# Patient Record
Sex: FEMALE | Race: WHITE | NOT HISPANIC OR LATINO
[De-identification: names, ages, dates, MRNs, and addresses within clinical notes are randomized per-mention and may not be internally consistent; named-entity substitution may affect disease eponyms.]

---

## 2019-02-28 ENCOUNTER — TRANSCRIPTION ENCOUNTER (OUTPATIENT)
Age: 39
End: 2019-02-28

## 2020-01-18 ENCOUNTER — TRANSCRIPTION ENCOUNTER (OUTPATIENT)
Age: 40
End: 2020-01-18

## 2020-02-11 ENCOUNTER — OUTPATIENT (OUTPATIENT)
Dept: OUTPATIENT SERVICES | Facility: HOSPITAL | Age: 40
LOS: 1 days | End: 2020-02-11

## 2020-02-24 ENCOUNTER — TRANSCRIPTION ENCOUNTER (OUTPATIENT)
Age: 40
End: 2020-02-24

## 2021-03-15 ENCOUNTER — EMERGENCY (EMERGENCY)
Facility: HOSPITAL | Age: 41
LOS: 1 days | Discharge: LEFT BEFORE DISCHARGE | End: 2021-03-15
Attending: EMERGENCY MEDICINE
Payer: MEDICARE

## 2021-03-15 VITALS — HEIGHT: 64 IN | RESPIRATION RATE: 8 BRPM | HEART RATE: 110 BPM | OXYGEN SATURATION: 77 % | WEIGHT: 139.99 LBS

## 2021-03-15 VITALS — HEIGHT: 62 IN | WEIGHT: 139.99 LBS

## 2021-03-15 PROCEDURE — 99284 EMERGENCY DEPT VISIT MOD MDM: CPT

## 2021-03-15 PROCEDURE — 96374 THER/PROPH/DIAG INJ IV PUSH: CPT

## 2021-03-15 PROCEDURE — 92950 HEART/LUNG RESUSCITATION CPR: CPT

## 2021-03-15 PROCEDURE — 96375 TX/PRO/DX INJ NEW DRUG ADDON: CPT

## 2021-03-15 PROCEDURE — 96376 TX/PRO/DX INJ SAME DRUG ADON: CPT | Mod: XU

## 2021-03-15 PROCEDURE — 99285 EMERGENCY DEPT VISIT HI MDM: CPT | Mod: 25

## 2021-03-15 RX ORDER — NALOXONE HYDROCHLORIDE 4 MG/.1ML
0.4 SPRAY NASAL ONCE
Refills: 0 | Status: COMPLETED | OUTPATIENT
Start: 2021-03-15 | End: 2021-03-15

## 2021-03-15 RX ORDER — NALOXONE HYDROCHLORIDE 4 MG/.1ML
0.2 SPRAY NASAL ONCE
Refills: 0 | Status: COMPLETED | OUTPATIENT
Start: 2021-03-15 | End: 2021-03-15

## 2021-03-15 RX ADMIN — NALOXONE HYDROCHLORIDE 0.4 MILLIGRAM(S): 4 SPRAY NASAL at 15:15

## 2021-03-15 RX ADMIN — NALOXONE HYDROCHLORIDE 0.2 MILLIGRAM(S): 4 SPRAY NASAL at 16:00

## 2021-03-15 NOTE — PHARMACOTHERAPY INTERVENTION NOTE - COMMENTS
Called pharmacy to obtain medication list. Pt is on oxycodone/APAP 10/325 every 4 to 6 hours and alprazolam 1 mG TID

## 2021-03-15 NOTE — ED ADULT NURSE REASSESSMENT NOTE - NS ED NURSE REASSESS COMMENT FT1
pt given second dose of Narcan after dropping sat and becoming minimally responsive. pt arguing with staff and refusing care, wants to leave. ER MD and this RN at bedside,  pt advised by ED team that if she leaves she will leave against medical advice and could potentially die. risks explained, Narcan medication explained that it may wear off and she could pass out and die. again patient "doesn't care and will be fine." pt is otherwise AOX3, of sound mind to make her own decisions. pt removed her own IV. got up and is ambulating around ED looking for the bathroom. Narcan kid and teaching provided to patient. verbalizes and demonstrates understanding.

## 2021-03-15 NOTE — ED PROVIDER NOTE - ATTENDING CONTRIBUTION TO CARE
Brad: I performed a face to face bedside interview with patient regarding history of present illness, review of symptoms and past medical history. I completed an independent physical exam.  I have discussed patient's plan of care with resident.   I agree with note as stated above including HISTORY OF PRESENT ILLNESS, HIV, PAST MEDICAL/SURGICAL/FAMILY/SOCIAL HISTORY, ALLERGIES AND HOME MEDICATIONS, REVIEW OF SYSTEMS, PHYSICAL EXAM, MEDICAL DECISION MAKING and any PROGRESS NOTES during the time I functioned as the attending physician for this patient unless otherwise noted. My brief assessment is as follows: 39F h/o chronic back pain presented unresponsive. As per EMS found unresponsive in a car outside dialysis center (had dropped off her stepfather), one round of CPR performed by HD staff. Patient somewhat arousable with EMS, pupils constricted on arrival. Patient awoke after 0.4mg Narcan IV in ED. Admits to taking Oxycodone, she ran out of her prescription and took a tablet she bought on the street. Patient began falling asleep and desaturating to 50-60%, received a second dose of narcan and woke up. Once she was awake she refused to stay in the ED and was upset that we did not know the location of her car/purse/stepfather. Every attempt made to calm the patient, she was given a phone to try to call family. Patient AAO x 3, has decision making capacity. Would like to take a cab and leave the ER. Patient is alert and oriented times three. No acute distress. Discussed patient's current condition and need for further diagnostic evaluation. Discussed that the narcan would likely wear off and she would become somnolent and possibly stop breathing again. Discussed risk of death. Patient wants to leave and understands leaving against medical advise. Risks, benefits and alternatives explained. Offered Narcan Kit but patient did not want to stay for kit/training. Advised to follow up with physician tomorrow or return immediately for any change in symptoms. Patient understand that they can return to the ER at any time to continue evaluation. Patient verbalizes understanding to the above instructions.

## 2021-03-15 NOTE — ED PROVIDER NOTE - NSFOLLOWUPINSTRUCTIONS_ED_ALL_ED_FT
You were evaluated in the ED after overdosing on oxycodone.    You required Narcan to reverse your overdose as you were found to have low oxygen and you were not breathing adequately.    You were offered observation in the ED to ensure your symptoms did not return, however you chose to leave the hospital against medical advice.    You were advised of the risk and consequences of leaving the hospital against medical advice and you accepted those risks.    You were offered a Narcan kit in case you overdose again, but you refused to take it.    Please follow up with your doctor this week.    Please stop abusing drugs.    You can return to the ED at any time if you decide to change your mind.    [facility] has a drug treatment center if you would like to get help with your drug use. Please use the information below:    [facility]: [facility]  [address] [place] [address] , [place] [address] [phone]

## 2021-03-15 NOTE — ED ADULT NURSE NOTE - OBJECTIVE STATEMENT
pt arrives to ED with BVM in place as well as OPA for airway ventilation. as per EMS pt found in her car unresponsive by staff at a medical building, CPR was initiated because she was pulseless and 1 round was completed prior to EMS arrival. pt immediately bagged, 18g to left AC and pt arrives to ED lethargic with pinpoint pupils. pt brought to critical care, ER MD at bedside and Narcan administered with successful results.

## 2021-03-15 NOTE — ED ADULT TRIAGE NOTE - CHIEF COMPLAINT QUOTE
Patient brought in by ambulance, found outside dialysis center unresponsive, CPR performed by bystanders, .  OPA by EMS, downtime 40-50mins, sent to Critical Care and given Narcan.

## 2021-03-15 NOTE — ED PROVIDER NOTE - OBJECTIVE STATEMENT
40 y/o F w/ ?seizure hx, chronic back pain presenting unresponsive. Brought in by EMS. Per EMS, pt was taking family member to doctor's office or HD center when she reportedly was found unresponsive in a car. Staff at facility reportedly started CPR on her. Pt was somewhat arousable for EMS, however became less responsive and hypoxic requiring BVM and OPA insertion. EMS unsure or drug abuse. Pt reportedly found in car in parking lot, no concern for trauma per EMS.

## 2021-03-15 NOTE — ED PROVIDER NOTE - CLINICAL SUMMARY MEDICAL DECISION MAKING FREE TEXT BOX
38 y/o F w/ ?seizure hx, chronic back pain presenting unresponsive. Pt given narcan shortly after arrival w/ immediate improvement in respiratory status and mental status. Pupils enlarge to 5 mm b/l. After pt more awake, she reports taking 30 mg of oxycodone that she chewed. Given hx and response to narcan, will plan for 38 y/o F w/ ?seizure hx, chronic back pain presenting unresponsive. Pt given narcan shortly after arrival w/ immediate improvement in respiratory status and mental status. Pupils enlarge to 5 mm b/l. After pt more awake, she reports taking 30 mg of oxycodone that she chewed.

## 2021-03-15 NOTE — ED ADULT NURSE REASSESSMENT NOTE - NS ED NURSE REASSESS COMMENT FT1
pt slightly hypoxic on room air, placed on nasal cannula. pt remains easily arousable to verbal stimuli. no distress noted, IV site patent, NSR on monitor.

## 2021-03-15 NOTE — ED PROVIDER NOTE - PATIENT PORTAL LINK FT
You can access the FollowMyHealth Patient Portal offered by [facility] by registering at the following website: [email or web address]. By joining [facility]’s FollowMyHealth portal, you will also be able to view your health information using other applications (apps) compatible with our system.

## 2021-03-15 NOTE — ED PROVIDER NOTE - PROGRESS NOTE DETAILS
Dr. Fidel Lim, PGY-3: pt required additional dose of narcan for intermittent desaturation and bradypnea. Sats as low as 55 w/ good waveform. Pt A&O x3 after second narcan dose. Has decision making capacity. Pt repeatedly asking for her belongings and where her stepfather is. Pt was explained to multiple times that she did not arrive to the hospital with her purse and her stepfather did not come to the hospital with her. Offered pt phone to call family/friends to bring her purse however she reports not knowing any numbers. Pt adamantly refusing to stay in the hospital at this time. Informed pt that considering she required more narcan and the fact that her oxygen levels were dropping she would likely require additional narcan. Informed pt that there is a concern she could die as a result of this. Pt stated that she "did not care." Informed pt that if she chooses to leave the hospital against medical advice then she is willing to accept any and all consequences associated with that, up to and including death. Pt reports she accepts these risks. Attempted multiple times to get pt to change her mind, however she refused. Dr. Fidel Lim, PGY-3: pt required additional dose of narcan for intermittent desaturation and bradypnea. Sats as low as 55 w/ good waveform. Pt A&O x3 after second narcan dose. Has decision making capacity. Pt repeatedly asking for her belongings and where her stepfather is. Pt was explained to multiple times that she did not arrive to the hospital with her purse and her stepfather did not come to the hospital with her. Offered pt phone to call family/friends to bring her purse however she reports not knowing any numbers. Pt adamantly refusing to stay in the hospital at this time. Informed pt that considering she required more narcan and the fact that her oxygen levels were dropping she would likely require additional narcan. Informed pt that there is a concern she could die as a result of this. Pt stated that she "did not care." Informed pt that if she chooses to leave the hospital against medical advice then she is willing to accept any and all consequences associated with that, up to and including death. Pt reports she accepts these risks. Attempted multiple times to get pt to change her mind, however she refused. Attempted to provide pt w/ narcan kit however she refused to accept narcan training and kit. Pt also refused to sign AMA paperwork. AMA paperwork given to rep to scan in. Pt confirms name is Paris Viera and  is 1980. Critical alias ([place]) sticker placed on AMA form and name and  written on form. Advised pt she could return to ED at any time if she changed her mind.

## 2021-03-15 NOTE — ED ADULT NURSE REASSESSMENT NOTE - NS ED NURSE REASSESS COMMENT FT1
pt agitated and trying to pull out IV pt yelling at MD Salinas and MD Lim at bedside they are trying to educate pt on need for IV and need to stay in ED

## 2021-03-15 NOTE — ED PROVIDER NOTE - PHYSICAL EXAMINATION
Gen: unresponsive  Head: NCAT  HEENT: EOMI, oral mucosa moist, normal conjunctiva. pupils 2 mm b/l and sluggish  Lung: Saturating 90s on NC. Clear lungs.   CV: RRR, no murmurs  Abd: soft, NTND, no guarding  MSK: no visible deformities  Neuro: Appears non focal  Skin: Warm, well perfused  Psych: unable to assess

## 2021-03-15 NOTE — ED ADULT NURSE NOTE - NSIMPLEMENTINTERV_GEN_ALL_ED
Implemented All Universal Safety Interventions:  [place] to call system. Call bell, personal items and telephone within reach. Instruct patient to call for assistance. Room bathroom lighting operational. Non-slip footwear when patient is off stretcher. Physically safe environment: no spills, clutter or unnecessary equipment. Stretcher in lowest position, wheels locked, appropriate side rails in place.

## 2021-03-15 NOTE — ED ADULT NURSE NOTE - EXPLANATION OF AMA FORM SIGNATURE
pt refusing to sign because she believed she should be properly discharged, even though she is refusing all care and doesn't want to take the medical advice offered to her.

## 2021-03-16 RX ORDER — ALPRAZOLAM 0.25 MG
1 TABLET ORAL
Qty: 0 | Refills: 0 | DISCHARGE

## 2021-03-16 RX ORDER — SERTRALINE 25 MG/1
1 TABLET, FILM COATED ORAL
Qty: 0 | Refills: 0 | DISCHARGE

## 2022-02-27 ENCOUNTER — EMERGENCY (EMERGENCY)
Facility: HOSPITAL | Age: 42
LOS: 1 days | Discharge: HOME | End: 2022-02-27
Attending: STUDENT IN AN ORGANIZED HEALTH CARE EDUCATION/TRAINING PROGRAM | Admitting: STUDENT IN AN ORGANIZED HEALTH CARE EDUCATION/TRAINING PROGRAM
Payer: MEDICARE

## 2022-02-27 VITALS
OXYGEN SATURATION: 100 % | RESPIRATION RATE: 18 BRPM | DIASTOLIC BLOOD PRESSURE: 71 MMHG | HEART RATE: 97 BPM | SYSTOLIC BLOOD PRESSURE: 126 MMHG

## 2022-02-27 VITALS
SYSTOLIC BLOOD PRESSURE: 99 MMHG | DIASTOLIC BLOOD PRESSURE: 63 MMHG | RESPIRATION RATE: 16 BRPM | TEMPERATURE: 98 F | HEART RATE: 103 BPM | OXYGEN SATURATION: 100 %

## 2022-02-27 DIAGNOSIS — R10.9 UNSPECIFIED ABDOMINAL PAIN: ICD-10-CM

## 2022-02-27 LAB
ALBUMIN SERPL ELPH-MCNC: 4 G/DL — SIGNIFICANT CHANGE UP (ref 3.3–5)
ALP SERPL-CCNC: 82 U/L — SIGNIFICANT CHANGE UP (ref 40–120)
ALT FLD-CCNC: 21 U/L — SIGNIFICANT CHANGE UP (ref 12–78)
AMPHET UR-MCNC: NEGATIVE — SIGNIFICANT CHANGE UP
ANION GAP SERPL CALC-SCNC: 12 MMOL/L — SIGNIFICANT CHANGE UP (ref 5–17)
ANISOCYTOSIS BLD QL: SLIGHT — SIGNIFICANT CHANGE UP
APAP SERPL-MCNC: <2 UG/ML — LOW (ref 10–30)
AST SERPL-CCNC: 19 U/L — SIGNIFICANT CHANGE UP (ref 15–37)
BARBITURATES UR SCN-MCNC: NEGATIVE — SIGNIFICANT CHANGE UP
BASOPHILS # BLD AUTO: 0 K/UL — SIGNIFICANT CHANGE UP (ref 0–0.2)
BASOPHILS NFR BLD AUTO: 0 % — SIGNIFICANT CHANGE UP (ref 0–2)
BENZODIAZ UR-MCNC: NEGATIVE — SIGNIFICANT CHANGE UP
BILIRUB SERPL-MCNC: 0.1 MG/DL — LOW (ref 0.2–1.2)
BUN SERPL-MCNC: 8 MG/DL — SIGNIFICANT CHANGE UP (ref 7–23)
CALCIUM SERPL-MCNC: 8.1 MG/DL — LOW (ref 8.5–10.1)
CHLORIDE SERPL-SCNC: 105 MMOL/L — SIGNIFICANT CHANGE UP (ref 96–108)
CO2 SERPL-SCNC: 20 MMOL/L — LOW (ref 22–31)
COCAINE METAB.OTHER UR-MCNC: NEGATIVE — SIGNIFICANT CHANGE UP
CREAT SERPL-MCNC: 1.2 MG/DL — SIGNIFICANT CHANGE UP (ref 0.5–1.3)
EOSINOPHIL # BLD AUTO: 0.23 K/UL — SIGNIFICANT CHANGE UP (ref 0–0.5)
EOSINOPHIL NFR BLD AUTO: 1 % — SIGNIFICANT CHANGE UP (ref 0–6)
ETHANOL SERPL-MCNC: 82 MG/DL — HIGH (ref 0–10)
GLUCOSE SERPL-MCNC: 298 MG/DL — HIGH (ref 70–99)
HCG SERPL-ACNC: <1 MIU/ML — SIGNIFICANT CHANGE UP
HCT VFR BLD CALC: 25.4 % — LOW (ref 34.5–45)
HCT VFR BLD CALC: 32.9 % — LOW (ref 34.5–45)
HGB BLD-MCNC: 7.7 G/DL — LOW (ref 11.5–15.5)
HGB BLD-MCNC: 9.9 G/DL — LOW (ref 11.5–15.5)
LIDOCAIN IGE QN: 170 U/L — SIGNIFICANT CHANGE UP (ref 73–393)
LYMPHOCYTES # BLD AUTO: 1.17 K/UL — SIGNIFICANT CHANGE UP (ref 1–3.3)
LYMPHOCYTES # BLD AUTO: 5 % — LOW (ref 13–44)
MACROCYTES BLD QL: SLIGHT — SIGNIFICANT CHANGE UP
MANUAL SMEAR VERIFICATION: SIGNIFICANT CHANGE UP
MCHC RBC-ENTMCNC: 23.3 PG — LOW (ref 27–34)
MCHC RBC-ENTMCNC: 23.7 PG — LOW (ref 27–34)
MCHC RBC-ENTMCNC: 30.1 GM/DL — LOW (ref 32–36)
MCHC RBC-ENTMCNC: 30.3 GM/DL — LOW (ref 32–36)
MCV RBC AUTO: 77 FL — LOW (ref 80–100)
MCV RBC AUTO: 78.9 FL — LOW (ref 80–100)
METHADONE UR-MCNC: NEGATIVE — SIGNIFICANT CHANGE UP
MONOCYTES # BLD AUTO: 0.7 K/UL — SIGNIFICANT CHANGE UP (ref 0–0.9)
MONOCYTES NFR BLD AUTO: 3 % — SIGNIFICANT CHANGE UP (ref 2–14)
NEUTROPHILS # BLD AUTO: 21.29 K/UL — HIGH (ref 1.8–7.4)
NEUTROPHILS NFR BLD AUTO: 84 % — HIGH (ref 43–77)
NEUTS BAND # BLD: 7 % — SIGNIFICANT CHANGE UP (ref 0–8)
NRBC # BLD: 0 /100 WBCS — SIGNIFICANT CHANGE UP (ref 0–0)
NRBC # BLD: 0 — SIGNIFICANT CHANGE UP
NRBC # BLD: SIGNIFICANT CHANGE UP /100 WBCS (ref 0–0)
OPIATES UR-MCNC: NEGATIVE — SIGNIFICANT CHANGE UP
PCP SPEC-MCNC: SIGNIFICANT CHANGE UP
PCP UR-MCNC: NEGATIVE — SIGNIFICANT CHANGE UP
PLAT MORPH BLD: NORMAL — SIGNIFICANT CHANGE UP
PLATELET # BLD AUTO: 171 K/UL — SIGNIFICANT CHANGE UP (ref 150–400)
PLATELET # BLD AUTO: 224 K/UL — SIGNIFICANT CHANGE UP (ref 150–400)
POIKILOCYTOSIS BLD QL AUTO: SLIGHT — SIGNIFICANT CHANGE UP
POLYCHROMASIA BLD QL SMEAR: SLIGHT — SIGNIFICANT CHANGE UP
POTASSIUM SERPL-MCNC: 4.4 MMOL/L — SIGNIFICANT CHANGE UP (ref 3.5–5.3)
POTASSIUM SERPL-SCNC: 4.4 MMOL/L — SIGNIFICANT CHANGE UP (ref 3.5–5.3)
PROT SERPL-MCNC: 7.5 G/DL — SIGNIFICANT CHANGE UP (ref 6–8.3)
RBC # BLD: 3.3 M/UL — LOW (ref 3.8–5.2)
RBC # BLD: 4.17 M/UL — SIGNIFICANT CHANGE UP (ref 3.8–5.2)
RBC # FLD: 18.4 % — HIGH (ref 10.3–14.5)
RBC # FLD: 18.5 % — HIGH (ref 10.3–14.5)
RBC BLD AUTO: SIGNIFICANT CHANGE UP
SALICYLATES SERPL-MCNC: <1.7 MG/DL — LOW (ref 2.8–20)
SARS-COV-2 RNA SPEC QL NAA+PROBE: SIGNIFICANT CHANGE UP
SODIUM SERPL-SCNC: 137 MMOL/L — SIGNIFICANT CHANGE UP (ref 135–145)
THC UR QL: NEGATIVE — SIGNIFICANT CHANGE UP
WBC # BLD: 14.84 K/UL — HIGH (ref 3.8–10.5)
WBC # BLD: 23.4 K/UL — HIGH (ref 3.8–10.5)
WBC # FLD AUTO: 14.84 K/UL — HIGH (ref 3.8–10.5)
WBC # FLD AUTO: 23.4 K/UL — HIGH (ref 3.8–10.5)

## 2022-02-27 PROCEDURE — 71045 X-RAY EXAM CHEST 1 VIEW: CPT

## 2022-02-27 PROCEDURE — 96365 THER/PROPH/DIAG IV INF INIT: CPT | Mod: XU

## 2022-02-27 PROCEDURE — 93005 ELECTROCARDIOGRAM TRACING: CPT

## 2022-02-27 PROCEDURE — 99283 EMERGENCY DEPT VISIT LOW MDM: CPT

## 2022-02-27 PROCEDURE — 85025 COMPLETE CBC W/AUTO DIFF WBC: CPT

## 2022-02-27 PROCEDURE — 96375 TX/PRO/DX INJ NEW DRUG ADDON: CPT

## 2022-02-27 PROCEDURE — 83690 ASSAY OF LIPASE: CPT

## 2022-02-27 PROCEDURE — 96376 TX/PRO/DX INJ SAME DRUG ADON: CPT

## 2022-02-27 PROCEDURE — 74177 CT ABD & PELVIS W/CONTRAST: CPT | Mod: 26,MA

## 2022-02-27 PROCEDURE — 80307 DRUG TEST PRSMV CHEM ANLYZR: CPT

## 2022-02-27 PROCEDURE — 70450 CT HEAD/BRAIN W/O DYE: CPT | Mod: MA

## 2022-02-27 PROCEDURE — 84702 CHORIONIC GONADOTROPIN TEST: CPT

## 2022-02-27 PROCEDURE — 99284 EMERGENCY DEPT VISIT MOD MDM: CPT

## 2022-02-27 PROCEDURE — 87635 SARS-COV-2 COVID-19 AMP PRB: CPT

## 2022-02-27 PROCEDURE — 71045 X-RAY EXAM CHEST 1 VIEW: CPT | Mod: 26

## 2022-02-27 PROCEDURE — 82962 GLUCOSE BLOOD TEST: CPT

## 2022-02-27 PROCEDURE — 70450 CT HEAD/BRAIN W/O DYE: CPT | Mod: 26,MA

## 2022-02-27 PROCEDURE — 96361 HYDRATE IV INFUSION ADD-ON: CPT

## 2022-02-27 PROCEDURE — 85027 COMPLETE CBC AUTOMATED: CPT

## 2022-02-27 PROCEDURE — 99285 EMERGENCY DEPT VISIT HI MDM: CPT | Mod: 25

## 2022-02-27 PROCEDURE — 74177 CT ABD & PELVIS W/CONTRAST: CPT | Mod: MA

## 2022-02-27 PROCEDURE — 80053 COMPREHEN METABOLIC PANEL: CPT

## 2022-02-27 PROCEDURE — 93010 ELECTROCARDIOGRAM REPORT: CPT

## 2022-02-27 PROCEDURE — 36415 COLL VENOUS BLD VENIPUNCTURE: CPT

## 2022-02-27 RX ORDER — METOCLOPRAMIDE HCL 10 MG
10 TABLET ORAL ONCE
Refills: 0 | Status: COMPLETED | OUTPATIENT
Start: 2022-02-27 | End: 2022-02-27

## 2022-02-27 RX ORDER — ONDANSETRON 8 MG/1
4 TABLET, FILM COATED ORAL ONCE
Refills: 0 | Status: COMPLETED | OUTPATIENT
Start: 2022-02-27 | End: 2022-02-27

## 2022-02-27 RX ORDER — SODIUM CHLORIDE 9 MG/ML
1000 INJECTION INTRAMUSCULAR; INTRAVENOUS; SUBCUTANEOUS ONCE
Refills: 0 | Status: COMPLETED | OUTPATIENT
Start: 2022-02-27 | End: 2022-02-27

## 2022-02-27 RX ORDER — PIPERACILLIN AND TAZOBACTAM 4; .5 G/20ML; G/20ML
3.38 INJECTION, POWDER, LYOPHILIZED, FOR SOLUTION INTRAVENOUS ONCE
Refills: 0 | Status: COMPLETED | OUTPATIENT
Start: 2022-02-27 | End: 2022-02-27

## 2022-02-27 RX ADMIN — SODIUM CHLORIDE 1000 MILLILITER(S): 9 INJECTION INTRAMUSCULAR; INTRAVENOUS; SUBCUTANEOUS at 05:08

## 2022-02-27 RX ADMIN — SODIUM CHLORIDE 1000 MILLILITER(S): 9 INJECTION INTRAMUSCULAR; INTRAVENOUS; SUBCUTANEOUS at 01:49

## 2022-02-27 RX ADMIN — ONDANSETRON 4 MILLIGRAM(S): 8 TABLET, FILM COATED ORAL at 01:49

## 2022-02-27 RX ADMIN — PIPERACILLIN AND TAZOBACTAM 200 GRAM(S): 4; .5 INJECTION, POWDER, LYOPHILIZED, FOR SOLUTION INTRAVENOUS at 05:21

## 2022-02-27 RX ADMIN — ONDANSETRON 4 MILLIGRAM(S): 8 TABLET, FILM COATED ORAL at 04:34

## 2022-02-27 RX ADMIN — Medication 10 MILLIGRAM(S): at 05:08

## 2022-02-27 RX ADMIN — SODIUM CHLORIDE 1000 MILLILITER(S): 9 INJECTION INTRAMUSCULAR; INTRAVENOUS; SUBCUTANEOUS at 04:34

## 2022-02-27 RX ADMIN — SODIUM CHLORIDE 1000 MILLILITER(S): 9 INJECTION INTRAMUSCULAR; INTRAVENOUS; SUBCUTANEOUS at 03:15

## 2022-02-27 RX ADMIN — PIPERACILLIN AND TAZOBACTAM 3.38 GRAM(S): 4; .5 INJECTION, POWDER, LYOPHILIZED, FOR SOLUTION INTRAVENOUS at 06:06

## 2022-02-27 NOTE — ED ADULT NURSE NOTE - OBJECTIVE STATEMENT
Patient brought in by ambulance was reported by EMS was dropped off at [facility] unresponsive and was given Narcan spray upon arrival in ED was vomiting and inducing self to vomit when asked what happened she said that she bought 3 tabs of street drugs but thinks it's different today was seen and evaluated by  MD with orders made and carried out attached to cardiac monitor EKG done blood works drawn sent to lab.

## 2022-02-27 NOTE — ED PROVIDER NOTE - PHYSICAL EXAMINATION
Gen: Well appearing in NAD  Head: NC/AT  Neck: trachea midline  cv: tachycardic  Resp:  No distress, lungs clear  abd diffusely tender  Ext: no deformities  Neuro:  A&O appears non focal  Skin:  Warm and dry as visualized  Psych:  Normal affect and mood

## 2022-02-27 NOTE — CONSULT NOTE ADULT - ASSESSMENT
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

## 2022-02-27 NOTE — CONSULT NOTE ADULT - NSCONSULTADDITIONALINFOA_GEN_ALL_CORE
Case discussed with GUERO Ridley, images abd blood work personally reviewed.  I was led to believe this patient was being admitted to medical service for Fentanyl overdose.    With respect to her RYGB and multiple re-interventions for intussusception and internal hernia by her bariatric surgeon Dr Suero (?) in [facility], there does not appear to be any concerning findings with regard to the RYGB anatomy.  She should f/u for routine bariatric care with her primary bariatric surgeon.  The patient's abdominal pain following Narcan administration could be due to her intoxication.    Unclear at this point is the etiology of the elevated WBC.  No signs of acute appendicitis although the appendix is markedly dilated.  Pt reports left sided abdominal pain, not RLQ pain.  Dilated appendix could be secondary to chronic mucocele and should be addressed surgically (appendectomy) once recovered from this "overdose".   Left sided abdominal pain could also be secondary to ruptured left corpus luteal cyst as seen on CT scan.  GYN f/u may be considered although this is likely physiologic.    Appears to have been discharged by ED following our recommendations against acute surgical intervention.

## 2022-02-27 NOTE — ED PROVIDER NOTE - PROGRESS NOTE DETAILS
CT findings cant exclude internal hernia, also dialted appendix, no rlq tendenress on exam,   surgery to see patient CTs reviewed by john JACK and Dr Fisher, no acute intervention needed at tthis time, pt can follow up with her surgeon   also discussed pts anemia, she is unsure what her baseline hb is, but dneiesw bleeding black or bloody stools, heavy periods. states she has not beent aking her supplements and iron that she is supposed to since her surgery, recommended pt to restart her supplements

## 2022-02-27 NOTE — ED ADULT NURSE REASSESSMENT NOTE - NS ED NURSE REASSESS COMMENT FT1
As per MD Farmer no blood cultures needed; as per PA will discuss case with Dr Siegel and decide the plan of care.
Patient lying in bed informed of the plan of care with understanding call bell within reach.
Patient stated she cannot urinate at this time bladder scan 487 cc in bladder straight cath patient and was able to get urine was sent to lab complaining of headache MD Farmer aware ordered for reglan was given to patient being evaluated by PA surgery at this time.
Pt resting comfortably in bed at this time, offers no complaints.  Pain resolved.  No n/v/d.  No chest pain or SOB.  Pt to be discharged home.  Heplock x 2 dc'd.  Dc instructions given, pt expressed understanding.

## 2022-02-27 NOTE — ED PROVIDER NOTE - NSFOLLOWUPINSTRUCTIONS_ED_ALL_ED_FT
1. TAKE ALL MEDICATIONS AS DIRECTED.    2. FOR PAIN OR FEVER YOU CAN TAKE IBUPROFEN (MOTRIN, ADVIL) OR ACETAMINOPHEN (TYLENOL) AS NEEDED, AS DIRECTED ON PACKAGING.  3. FOLLOW UP WITH YOUR PRIMARY DOCTOR WITHIN 5 DAYS AS DIRECTED. PLease have your hemoglobin rechecked and start on your supplements.   4. IF YOU HAD LABS OR IMAGING DONE, YOU WERE GIVEN COPIES OF ALL LABS AND/OR IMAGING RESULTS FROM YOUR ER VISIT--PLEASE TAKE THEM WITH YOU TO YOUR FOLLOW UP APPOINTMENTS.  5. IF NEEDED, CALL PATIENT ACCESS SERVICES AT [phone] TO FIND A PRIMARY CARE PHYSICIAN.  OR CALL [phone] TO MAKE AN APPOINTMENT WITH THE CLINIC.  6. RETURN TO THE ER FOR ANY WORSENING SYMPTOMS OR CONCERNS.    Follow up with your surgeon as soon as possible,   Return to ED for new or worsening symptoms.     Abdominal Pain    Many things can cause abdominal pain. Many times, abdominal pain is not caused by a disease and will improve without treatment. Your health care provider will do a physical exam to determine if there is a dangerous cause of your pain; blood tests and imaging may help determine the cause of your pain. However, in many cases, no cause may be found and you may need further testing as an outpatient. Monitor your abdominal pain for any changes.     SEEK IMMEDIATE MEDICAL CARE IF YOU HAVE ANY OF THE FOLLOWING SYMPTOMS: worsening abdominal pain, uncontrollable vomiting, profuse diarrhea, inability to have bowel movements or pass gas, black or bloody stools, fever accompanying chest pain or back pain, or fainting. These symptoms may represent a serious problem that is an emergency. Do not wait to see if the symptoms will go away. Get medical help right away. Call 911 and do not drive yourself to the hospital.                  OPIOID USE DISORDER - General Information           Opioid Use Disorder    WHAT YOU NEED TO KNOW:    What is opioid use disorder (OUD)? OUD is a medical condition that develops from long-term use or misuse of an opioid. You are not able to stop taking the opioid even though it causes physical or social problems. OUD may be use of an opioid such as heroin or misuse of a prescription opioid such as fentanyl. This disorder is also called opioid abuse.    What are the signs and symptoms of OUD? Signs and symptoms of OUD include at least 2 of the following in a 12-month period:  •You take an opioid in a way it was not intended. This is also called misuse. Examples of misuse include taking more than prescribed or taking it longer than recommended. Another example is taking it for a different reason than prescribed. Your prescription may be for pain relief, but you take it because it makes you feel good. Misuse can also mean you take the opioid even though you do not have a prescription for it.      •You have a strong urge or craving for the opioid. This is also called addiction. You spend large amounts of time trying to get, take, or recover from the opioid. You also spend time thinking about when you can take the opioid again.      •You become dependent on the opioid. Dependence means your body becomes used to the opioid. You have withdrawal symptoms when you do not take the opioid for a short amount of time. You have to take it to stop or prevent withdrawal symptoms, such as shaky hands.      •You become tolerant to the opioid. Tolerance means you continue to need higher amounts to feel the effects you want.      •You are not able to stop, or to take less. You start it again when you try to quit. You try to lower the amount or increase time without it, but you are not able.      •You continue the opioid even though it causes problems or is dangerous. For example, you drive even though the opioid makes you drowsy. You try to make the effect stronger by mixing the opioid with alcohol, medicines, or drugs. You have problems at school, work, or home. You spend less time doing important or enjoyable activities.      How is OUD diagnosed and treated? Your healthcare provider will ask about the amount and type of opioids you had over the last 12 months. Your blood or urine may be used to check the level of opioid in your system. Your provider can help you make decisions about treatment. Treatment may be offered in a hospital, outpatient facility, or treatment center.   •Detoxification (detox) means healthcare providers will slowly decrease the amount of the opioid. Another opioid medicine, such as methadone, may be given to help decrease symptoms of withdrawal.      •Therapy may include work with a psychiatrist, psychologist, or therapist. Therapy can happen in group or individual sessions. Some therapy may include family members. Your healthcare provider or therapist may be able to help you find a support group in your area. A support group is a way to get help from others who have OUD.      What do I need to know about opioid safety?   •Do not suddenly stop the opioid. A sudden stop may cause dangerous side effects. Work with your healthcare provider to decrease the amount slowly.      •Do not take prescription opioids that belong to someone else. The kind or amount may not be right for you.      •Do not mix opioids with other medicines, drugs, or alcohol. The combination can cause an overdose, or cause you to stop breathing. Alcohol, sleeping pills, and medicines such as antihistamines can make you sleepy. A combination with opioids can lead to a coma.      •Learn about the signs of an overdose. Examples include slow breathing, pale or cold skin, and small pupils. Tell others about these signs so they will get help for you if needed. Talk to your healthcare provider about naloxone. You may be able to keep naloxone at home in case of an overdose. Your family and friends can also be trained on how to give it to you if needed.      •Take prescribed opioids exactly as directed. Do not take more than the recommended amount. Do not take it more often than recommended or for a different reason. Be sure to remove an old patch before you place a new one. Make sure the patch is not exposed to sunlight. Sunlight speeds up the opioid release from the patch.      •Keep opioids out of the reach of children. Store opioids in a locked cabinet or in a location that children cannot get to.  Common Childproofing Latches            •Follow instructions for what to do with leftover prescription opioids. Your healthcare provider will give you instructions for how to dispose of it safely. This helps make sure no one else gets to it.      Where can I get support and more information?   •Substance Abuse and Mental Health Services Administration  [address]  [place] [address]  Web Address: http://www.samhsa.gov      •National [place] on Drug Abuse  [address], [address]  [place], [place]  Phone: 1-419.993.1261  Web Address: www.krysten.nih.gov        Call your local emergency number (911 in the [place]) or have someone call if:   •You have chest pain or trouble breathing.      •You have a seizure.      •You cannot be woken.      When should I seek immediate care?   •You have trouble staying awake and your breathing is slow or shallow.      •You have a fast, slow, or irregular heartbeat.      •You have pale or cold skin.      •You feel lightheaded or faint.      •Your speech is slurred, or you are confused.      When should I call my doctor?   •You have nausea and are vomiting, or you cannot stop vomiting.      •You have balance problems.      •You have questions or concerns about your condition or care.      CARE AGREEMENT:    You have the right to help plan your care. Learn about your health condition and how it may be treated. Discuss treatment options with your healthcare providers to decide what care you want to receive. You always have the right to refuse treatment.        ANEMIA - Discharge Care           Anemia    WHAT YOU NEED TO KNOW:    Anemia is a low number of red blood cells or a low amount of hemoglobin in your red blood cells. Hemoglobin is a protein that helps carry oxygen throughout your body. Red blood cells use iron to create hemoglobin. Anemia may develop if your body does not have enough iron. It may also develop if your body does not make enough red blood cells or they die faster than your body can make them.    DISCHARGE INSTRUCTIONS:    Call your local emergency number (911 in the ), or have someone call if:   •You lose consciousness.      •You have severe chest pain.      Seek care immediately if:   •You have dark or bloody bowel movements.          Call your doctor if:   •Your symptoms are worse, even after treatment.      •You have questions or concerns about your condition or care.      Medicines:   •Iron or folic acid supplements help increase your red blood cell and hemoglobin levels.       •Vitamin B12 injections may help boost your red blood cell level and decrease your symptoms. Ask your healthcare provider how to inject B12.      •Take your medicine as directed. Contact your healthcare provider if you think your medicine is not helping or if you have side effects. Tell him of her if you are allergic to any medicine. Keep a list of the medicines, vitamins, and herbs you take. Include the amounts, and when and why you take them. Bring the list or the pill bottles to follow-up visits. Carry your medicine list with you in case of an emergency.      Prevent anemia: Eat healthy foods rich in iron and vitamin C. Nuts, meat, dark leafy green vegetables, and beans are high in iron and protein. Vitamin C helps your body absorb iron. Foods rich in vitamin C include oranges and other citrus fruits. Ask your healthcare provider for a list of other foods that are high in iron or vitamin C. Ask if you need to be on a special diet.    Sources of Iron       Sources of Vitamin C         Follow up with your doctor as directed: Write down your questions so you remember to ask them during your visits.

## 2022-02-27 NOTE — ED PROVIDER NOTE - PATIENT PORTAL LINK FT
You can access the FollowMyHealth Patient Portal offered by [facility] by registering at the following website: [email or web address]. By joining [facility] FollowMyHealth portal, you will also be able to view your health information using other applications (apps) compatible with our system.

## 2022-02-27 NOTE — CONSULT NOTE ADULT - PROBLEM SELECTOR RECOMMENDATION 9
-Discussed with Dr. Fisher  -No acute findings on CT  -No indication for acute surgical intervention  -Pt should follow-up with Dr. DE JESUS at [facility] for poss elective appendectomy    Surgical Team Contact Information  Spectralink: Ext: [phone] or [phone]  Pager: [phone] -Discussed with Dr. Fisher  -No acute findings on CT pertaining to RYGB.  Anastomosis patent with no signs of obstruction.  Anastomosis in appropriate location.  No signs of mesenteric swirling.  CT imaging consistent with JJ anastomotic changes in the mesentery.  -No indication for acute bariatric intervention.  -Dilated appendix without signs of appendicitis.  Possible mucocele, should be addressed surgically once recovered from acute intoxication.  Surgical follow up recommended.  -Pt should follow-up with Dr. DE JESUS [facility] for poss elective appendectomy    Surgical Team Contact Information  Spectralink: Ext: [phone] or [phone]  Pager: [phone]

## 2022-02-27 NOTE — ED PROVIDER NOTE - OBJECTIVE STATEMENT
40yo F ho substance abuse presents after episode of unresponsiveness, pt states she took a pill from her friend, believed it was oxycontin and then has no recollection of what happened next. per ems pt was found unresponsive given 2mg of narcan x 2 with response. since then pt states she having severe abd pain and nausea, arrives trying to self induce vomiting. 42yo F ho substance abuse presents after episode of unresponsiveness, pt states she took a pill from her friend, believed it was oxycontin and then has no recollection of what happened next. per ems pt was found unresponsive given 2mg of narcan x 2 with response. since then pt states she having severe abd pain and nausea, arrives trying to self induce vomiting.  pt states she has been given narcan multiple times in the past 42yo F ho substance abuse presents after episode of unresponsiveness, pt states she took a pill from her friend, believed it was oxycontin and then has no recollection of what happened next. per ems pt was found unresponsive given 2mg of narcan x 2 with response. since then pt states she having severe abd pain and nausea, arrives trying to self induce vomiting. 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 per ems pt was found unresponsive given 2mg of narcan x 2 with response. since then pt states she having severe abd pain and nausea, arrives trying to self induce vomiting. 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

## 2022-02-27 NOTE — ED PROVIDER NOTE - CLINICAL SUMMARY MEDICAL DECISION MAKING FREE TEXT BOX
40yo F sp opiate overdose, responded to narcan, now with vomiting and abd pain  will obs, labs, CT, fluids,  pt states she has narcan kit at home

## 2022-02-27 NOTE — ED ADULT TRIAGE NOTE - CHIEF COMPLAINT QUOTE
per ems was dropped off at [facility] unresponsive, was given 4mg of narcan intranasal by ems, arrives to ED awake and vomiting

## 2022-02-27 NOTE — CONSULT NOTE ADULT - SUBJECTIVE AND OBJECTIVE BOX
40yo F with PMHx of substance abuse w/multiple opiate ODs req narcan, s/p CLAYTON bypass (4/2009, [facility]AVI[facility]A [facility]), internal hernia repair (2010), intussusception with revision (2020) presents after episode of unresponsiveness, pt states she took a pill from her friend, believed it was oxycontin and then has no recollection of what happened next. Per ems pt was found unresponsive given 2mg of narcan x 2 with response. Since then pt states she having severe abd pain and nausea, arrives trying to self induce vomiting. Pt states she had L sided hernia that she occasionally has to reduce on her own. Pt states abdominal pain started today described as dull aching on L side.    PAST MEDICAL & SURGICAL HISTORY:    Allergies:  Wellbutrin (Seizure)    Home Medications:      Family History:  FAMILY HISTORY:      ROS:  Constitutional: SEE HPI  Skin: Denies rash.  Eyes: Denies recent vision problems or eye pain.  ENT: Denies congestion, ear pain, or sore throat.  Endocrine: Denies thyroid problems.  Cardiovascular: Denies chest pain or palpation.  Respiratory: Denies cough, shortness of breath, congestion, or wheezing.  Gastrointestinal: SEE HPI  Genitourinary: Denies dysuria.  Musculoskeletal: Denies joint swelling.  Neurologic: Denies headache.      PHYSICAL EXAM:  GENERAL: No acute distress, well-developed  HEAD:  Atraumatic, Normocephalic  ABDOMEN: Soft, mildly-tender on left, non-distended laparoscopic scars noted  NEUROLOGY: A&O x 3, no focal deficits    Data:  T(C): 36.6 (02-27-22 @ 06:05), Max: 36.6 (02-27-22 @ 06:05)  HR: 91 (02-27-22 @ 06:05) (86 - 97)  BP: 99/66 (02-27-22 @ 06:05) (91/62 - 126/71)  RR: 16 (02-27-22 @ 06:05) (16 - 18)  SpO2: 99% (02-27-22 @ 06:05) (99% - 100%)                        7.7    14.84 )-----------( 171      ( 27 Feb 2022 05:01 )             25.4     02-27    137  |  105  |  8   ----------------------------<  298<H>  4.4   |  20<L>  |  1.20    Ca    8.1<L>      27 Feb 2022 01:59    TPro  7.5  /  Alb  4.0  /  TBili  0.1<L>  /  DBili  x   /  AST  19  /  ALT  21  /  AlkPhos  82  02-27      LIVER FUNCTIONS - ( 27 Feb 2022 01:59 )  Alb: 4.0 g/dL / Pro: 7.5 g/dL / ALK PHOS: 82 U/L / ALT: 21 U/L / AST: 19 U/L / GGT: x           Radiology:  < from: CT Abdomen and Pelvis w/ IV Cont (02.27.22 @ 04:15) >  FINDINGS:    LOWER CHEST: Atelectasis.    LIVER: No focal lesion. Suggest mild periportal edema, which can be seen   in fluid resuscitation.  GALLBLADDER/BILE DUCTS: No intrahepatic or extrahepatic biliary   dilatation. No significant gallbladder edema.  PANCREAS: Unremarkable.  SPLEEN: Unremarkable.    ADRENALS: Unremarkable.  KIDNEYS/URETERS: No hydronephrosis, hydroureter or significant   perinephric stranding.  BLADDER: Partially distended.  REPRODUCTIVE ORGANS: Anteverted uterus with a 3.0 x 2.2 cm hypoenhancing   lesion at the fundus, likely fibroid. A 2.0 x 2.0 cm corpus luteum.    BOWEL: Blake-en-Y gastric bypass. Somewhat clustered small bowel loops in   theleft abdomen with a question of mild mesenteric swirling and minimal   mesenteric edema. Jejunojejunal anastomosis located in the left abdomen.   No bowel obstruction. Dilated, fluid-filled mid/distal appendix (up to   2.0 cm in diameter) without significant inflammatory change.  PERITONEUM: Small pelvic free fluid with mildly increased attenuation,   which may contain blood products/debris.  VESSELS: Atherosclerosis. Normal caliber of the abdominal aorta.  RETROPERITONEUM: No lymphadenopathy.  ABDOMINAL WALL/SOFT TISSUES: Postsurgical changes along the anterior   abdominal wall soft tissue. Small fat-containing umbilical hernia.  BONES: Degenerative changes of the spine. Grade 1 anterolisthesis of L5   on S1 with bilateral L5 pars defect. Nonspecific small sclerotic focus at   the right sacrum.    IMPRESSION:    Blake-en-Y gastric bypass. Small bowel findings in the left abdomen as   described, which may be related to post surgical status although internal   hernia is not entirely excluded. No bowel obstruction. Recommend   comparison to previous outside study and follow-up as indicated.    Dilated, fluid-filled mid/distal appendix. Differential diagnosis   includes appendiceal mucocele although neoplasm cannot be excluded. No   significant inflammatory change. Recommend clinical correlation to assess   appendicitis and follow-up.    Findings suggestive of partially ruptured left ovarian corpus   luteal/hemorrhagic cyst. Recommend clinical correlation follow-up pelvic   ultrasoundas indicated.    Additional findings as described.    Discussed with Dr. Farmer.    --- End of Report ---    KALINA ABBOTT MD; Attending Radiologist  This document has been electronically signed. Feb 27 2022  4:52AM    < end of copied text >

## 2022-02-27 NOTE — ED ADULT NURSE NOTE - CHIEF COMPLAINT QUOTE
per ems was dropped off at [facility] unresponsive, was given 4mg of narcan intranasal by ems, arrives to ED awake and vomiting

## 2022-02-27 NOTE — ED PROVIDER NOTE - CARE PLAN
1 Principal Discharge DX:	Drug overdose  Secondary Diagnosis:	Anemia  Secondary Diagnosis:	Abdominal pain

## 2022-03-02 PROBLEM — F32.9 MAJOR DEPRESSIVE DISORDER, SINGLE EPISODE, UNSPECIFIED: Chronic | Status: ACTIVE | Noted: 2021-03-15

## 2022-03-02 PROBLEM — M54.9 DORSALGIA, UNSPECIFIED: Chronic | Status: ACTIVE | Noted: 2021-03-15

## 2022-08-16 PROBLEM — Z00.00 ENCOUNTER FOR PREVENTIVE HEALTH EXAMINATION: Status: ACTIVE | Noted: 2022-08-16

## 2022-08-17 ENCOUNTER — APPOINTMENT (OUTPATIENT)
Dept: VASCULAR SURGERY | Facility: CLINIC | Age: 42
End: 2022-08-17

## 2022-08-24 ENCOUNTER — APPOINTMENT (OUTPATIENT)
Dept: VASCULAR SURGERY | Facility: CLINIC | Age: 42
End: 2022-08-24

## 2022-10-27 ENCOUNTER — NON-APPOINTMENT (OUTPATIENT)
Age: 42
End: 2022-10-27

## 2022-10-31 ENCOUNTER — APPOINTMENT (OUTPATIENT)
Dept: PODIATRY | Facility: CLINIC | Age: 42
End: 2022-10-31

## 2022-10-31 VITALS — BODY MASS INDEX: 30.12 KG/M2 | WEIGHT: 170 LBS | HEIGHT: 63 IN

## 2022-10-31 DIAGNOSIS — Z78.9 OTHER SPECIFIED HEALTH STATUS: ICD-10-CM

## 2022-10-31 DIAGNOSIS — Z86.59 PERSONAL HISTORY OF OTHER MENTAL AND BEHAVIORAL DISORDERS: ICD-10-CM

## 2022-10-31 PROCEDURE — 20600 DRAIN/INJ JOINT/BURSA W/O US: CPT | Mod: RT

## 2022-10-31 PROCEDURE — 99214 OFFICE O/P EST MOD 30 MIN: CPT | Mod: 25

## 2022-10-31 NOTE — PHYSICAL EXAM
[Right] : right foot and ankle [Mild] : mild swelling of toe(s) [1st] : 1st [2+] : posterior tibialis pulse: 2+ [] : no full range of motion of foot [de-identified] : MTP joint DF 10 degrees [TWNoteComboBox6] : MTP joint PF 10 degrees

## 2022-10-31 NOTE — REASON FOR VISIT
[FreeTextEntry2] : Patient is here for right foot seen back in 2020 and cortisone injection it got better but now its a lot worst

## 2022-10-31 NOTE — ASSESSMENT
[FreeTextEntry1] : CELESTONE 6 MG APPLIED WITH LIDOCAINE PLAIN 10 MG  TO REDUCE PAIN AND SWELLING\par CELESTONE 2 UNITS, LIDOCAINE PLAIN 2 UNITS\par NSAID OF CHOICE FOR PAIN.\par TYLENOL FOR PAIN\par COLD COMPRESSES.\par \par I DISCUSSED SURGICAL INTERVENTION WHICH MAY INCLUDE REMODELING OF JOINT, IMPLANT OR FUSION

## 2022-11-14 ENCOUNTER — APPOINTMENT (OUTPATIENT)
Dept: ORTHOPEDIC SURGERY | Facility: CLINIC | Age: 42
End: 2022-11-14

## 2022-11-14 VITALS — BODY MASS INDEX: 30.12 KG/M2 | WEIGHT: 170 LBS | HEIGHT: 63 IN

## 2022-11-14 PROCEDURE — 99214 OFFICE O/P EST MOD 30 MIN: CPT

## 2022-11-14 PROCEDURE — 72100 X-RAY EXAM L-S SPINE 2/3 VWS: CPT

## 2022-11-14 NOTE — HISTORY OF PRESENT ILLNESS
[Gradual] : gradual [7] : 7 [6] : 6 [Sharp] : sharp [Shooting] : shooting [Throbbing] : throbbing [Constant] : constant [Heat] : heat [Disabled] : Work status: disabled [de-identified] : Patient presents today with lower back pain for years with NKI. Previously completed PT, tried pain medication and had trigger point injections. Experiencing pain radiating down the right side to the mid thigh and the left buttock, has tingling. Taking Advil/Tylenol with no relief. Denies recent imaging.  [] : no [FreeTextEntry7] :  down the right side to the mid thigh and the left buttock [de-identified] : activity

## 2022-11-15 ENCOUNTER — APPOINTMENT (OUTPATIENT)
Dept: PODIATRY | Facility: CLINIC | Age: 42
End: 2022-11-15

## 2022-11-15 PROCEDURE — 20600 DRAIN/INJ JOINT/BURSA W/O US: CPT | Mod: RT

## 2022-11-15 PROCEDURE — 99213 OFFICE O/P EST LOW 20 MIN: CPT | Mod: 25

## 2022-11-15 NOTE — REASON FOR VISIT
[FreeTextEntry2] : Patient is here for right foot follow up.  FEELS 40% IMPROVED AFTER THE INJECTION.

## 2022-11-15 NOTE — ASSESSMENT
[FreeTextEntry1] : CELESTONE 6 MG APPLIED WITH LIDOCAINE PLAIN 10 MG  TO REDUCE PAIN AND SWELLING\par CELESTONE 2 UNITS, LIDOCAINE PLAIN 2 UNITS\par NSAID OF CHOICE FOR PAIN.\par TYLENOL FOR PAIN\par COLD COMPRESSES.\par \par

## 2022-11-15 NOTE — PHYSICAL EXAM
[Right] : right foot and ankle [NL (40)] : MTP joint DF 40 degrees [NL (20)] : MTP joint PF 20 degrees [5___] : [facility] 5[unfilled]/5 [2+] : posterior tibialis pulse: 2+ [Normal] : saphenous nerve sensation normal [] : non-antalgic [FreeTextEntry3] : NONE

## 2022-12-20 ENCOUNTER — APPOINTMENT (OUTPATIENT)
Dept: PODIATRY | Facility: CLINIC | Age: 42
End: 2022-12-20

## 2022-12-20 PROCEDURE — 99213 OFFICE O/P EST LOW 20 MIN: CPT | Mod: 25

## 2022-12-20 PROCEDURE — 20600 DRAIN/INJ JOINT/BURSA W/O US: CPT | Mod: RT

## 2022-12-20 NOTE — ASSESSMENT
[FreeTextEntry1] : CELESTONE 6 MG APPLIED WITH LIDOCAINE PLAIN 10 MG  TO REDUCE PAIN AND SWELLING FIRST MPJ RIGHT FOOT AND INTERSPACE 1ST. \par CELESTONE 2 UNITS, LIDOCAINE PLAIN 2 UNITS\par NSAID OF CHOICE FOR PAIN.\par TYLENOL FOR PAIN\par COLD COMPRESSES.\par I DISCUSSED SURGICAL CORRECTION OF BUNION IF NO CHANGE WITH INJECTION.\par WILL REPEAT INJECTION IN 6-8 WEEKS. \par RTO 2 MONTHS\par

## 2022-12-20 NOTE — HISTORY OF PRESENT ILLNESS
[de-identified] : Patient states pain is increasing since last visit. She reports that the pain is not as bad as when she first came in but it is increasing over time.

## 2022-12-20 NOTE — PHYSICAL EXAM
[Right] : right foot and ankle [5___] : [facility] 5[unfilled]/5 [2+] : posterior tibialis pulse: 2+ [Normal] : saphenous nerve sensation normal [Normal Coordination] : normal coordination [Normal DTR UE/LE] : normal DTR UE/LE  [Normal Sensation] : normal sensation [Normal Mood and Affect] : normal mood and affect [Orientated] : orientated [Normal Skin] : normal skin [No Rash] : no rash [No Ulcers] : no ulcers [No Lesions] : no lesions [Well Developed] : well developed [Well Nourished] : well nourished [Mild] : mild swelling of toe(s) [1st] : 1st [NL (40)] : plantar flexion 40 degrees [NL 30)] : inversion 30 degrees [NL (20)] : eversion 20 degrees [] : non-antalgic [FreeTextEntry3] : NONE

## 2023-01-31 ENCOUNTER — APPOINTMENT (OUTPATIENT)
Dept: PODIATRY | Facility: CLINIC | Age: 43
End: 2023-01-31

## 2023-03-28 ENCOUNTER — APPOINTMENT (OUTPATIENT)
Dept: ORTHOPEDIC SURGERY | Facility: CLINIC | Age: 43
End: 2023-03-28
Payer: MEDICARE

## 2023-03-28 DIAGNOSIS — M20.11 HALLUX VALGUS (ACQUIRED), RIGHT FOOT: ICD-10-CM

## 2023-03-28 DIAGNOSIS — M19.079 PRIMARY OSTEOARTHRITIS, UNSPECIFIED ANKLE AND FOOT: ICD-10-CM

## 2023-03-28 PROCEDURE — 99213 OFFICE O/P EST LOW 20 MIN: CPT | Mod: 25

## 2023-03-28 PROCEDURE — 20600 DRAIN/INJ JOINT/BURSA W/O US: CPT | Mod: RT

## 2023-03-28 NOTE — ASSESSMENT
[FreeTextEntry1] : CELESTONE 6 MG APPLIED WITH LIDOCAINE PLAIN 10 MG  TO REDUCE PAIN AND SWELLING\par CELESTONE 2 UNITS, LIDOCAINE PLAIN 2 UNITS APPLIED TO 1ST MPJ. RIGHT FOOT.\par NSAID OF CHOICE FOR PAIN.\par TYLENOL FOR PAIN\par COLD COMPRESSES.\par I DISCUSSED BUNION REPAIR.\par RTO PRN\par \par \par

## 2023-03-28 NOTE — PHYSICAL EXAM
[Right] : right foot and ankle [Mild] : mild swelling of MTP joint/great toe [1st] : 1st [NL (40)] : MTP joint DF 40 degrees [NL (20)] : MTP joint PF 20 degrees [5___] : [facility] 5[unfilled]/5 [2+] : posterior tibialis pulse: 2+ [Normal] : saphenous nerve sensation normal [] : varicosities are not warm and well perfused

## 2023-03-28 NOTE — HISTORY OF PRESENT ILLNESS
[de-identified] : Patient states pain is increasing since last visit. She reports that the pain is not as bad as when she first came in but it is increasing over time.

## 2023-03-28 NOTE — PROCEDURE
[Other: ____] : [unfilled] [Right] : of the right [1st] : 1st [Metatarsophalangeal joint] : metatarsophalangeal joint [Pain] : pain [Inflammation] : inflammation [X-ray evidence of Osteoarthritis on this or prior visit] : x-ray evidence of Osteoarthritis on this or prior visit [Repeat series performed] : repeat series performed [Alcohol] : alcohol [Betadine] : betadine [Ethyl Chloride sprayed topically] : ethyl chloride sprayed topically [Sterile technique used] : sterile technique used [___ cc    3mg] :  Betamethasone (Celestone) ~Vcc of 3mg [___ cc    1%] : Lidocaine ~Vcc of 1%

## 2023-04-19 NOTE — ED ADULT NURSE REASSESSMENT NOTE - BEFAST EYES
In an effort to ensure that our patients [employer], a Team Member has reviewed your chart and identified an opportunity to provide the best care possible. An attempt was made to discuss or schedule overdue Preventive or Disease Management screening.     The Outcome was Contact was not made, left message If you have any questions or need help with scheduling, contact our Health Outreach Team at [phone]. Care Gaps include Diabetes and Immunizations.    Mary Richmond  Population Health Assistant   PH. [phone]       No

## 2023-06-26 ENCOUNTER — FORM ENCOUNTER (OUTPATIENT)
Age: 43
End: 2023-06-26

## 2023-11-16 ENCOUNTER — NON-APPOINTMENT (OUTPATIENT)
Age: 43
End: 2023-11-16

## 2024-01-18 ENCOUNTER — APPOINTMENT (OUTPATIENT)
Dept: ORTHOPEDIC SURGERY | Facility: CLINIC | Age: 44
End: 2024-01-18
Payer: MEDICARE

## 2024-01-18 DIAGNOSIS — M51.36 OTHER INTERVERTEBRAL DISC DEGENERATION, LUMBAR REGION: ICD-10-CM

## 2024-01-18 DIAGNOSIS — M47.817 SPONDYLOSIS W/OUT MYELOPATHY OR RADICULOPATHY, LUMBOSACRAL REGION: ICD-10-CM

## 2024-01-18 DIAGNOSIS — Q76.0 SPINA BIFIDA OCCULTA: ICD-10-CM

## 2024-01-18 DIAGNOSIS — M48.061 SPINAL STENOSIS, LUMBAR REGION WITHOUT NEUROGENIC CLAUDICATION: ICD-10-CM

## 2024-01-18 DIAGNOSIS — M51.37 OTHER INTERVERTEBRAL DISC DEGENERATION, LUMBOSACRAL REGION: ICD-10-CM

## 2024-01-18 DIAGNOSIS — M43.17 SPONDYLOLISTHESIS, LUMBOSACRAL REGION: ICD-10-CM

## 2024-01-18 DIAGNOSIS — M70.61 TROCHANTERIC BURSITIS, RIGHT HIP: ICD-10-CM

## 2024-01-18 DIAGNOSIS — M43.06 SPONDYLOLYSIS, LUMBAR REGION: ICD-10-CM

## 2024-01-18 PROCEDURE — 99214 OFFICE O/P EST MOD 30 MIN: CPT

## 2024-01-18 RX ORDER — DICLOFENAC SODIUM 1% 10 MG/G
1 GEL TOPICAL 4 TIMES DAILY
Qty: 1 | Refills: 2 | Status: ACTIVE | COMMUNITY
Start: 2024-01-18 | End: 1900-01-01

## 2024-01-19 NOTE — DATA REVIEWED
[MRI] : MRI [Lumbar Spine] : lumbar spine [I independently reviewed and interpreted images and report] : I independently reviewed and interpreted images and report [FreeTextEntry1] : 6/2023 MRI of L-Spine was reviewed today and is as follows:  Bulging disc L4-5 Anterolisthesis L5-S1 with bilateral pars defect Bilateral foraminal stenosis L5-S1 R>L

## 2024-01-19 NOTE — HISTORY OF PRESENT ILLNESS
[Gradual] : gradual [7] : 7 [6] : 6 [Sharp] : sharp [Shooting] : shooting [Throbbing] : throbbing [Constant] : constant [Heat] : heat [Disabled] : Work status: disabled [de-identified] : Follow up lumbar spine MRI Results at [place]. Patient states she has chronic pain. Patient states her pain radiates into the left buttock. Patient denies taking pain medication.  Today's Pain 8/10 [] : no [FreeTextEntry7] :  down the right side to the mid thigh and the left buttock [de-identified] : activity

## 2024-01-19 NOTE — ASSESSMENT
[FreeTextEntry1] : 6/2023 MRI of L-Spine was reviewed today and is as follows:  Bulging disc L4-5 Anterolisthesis L5-S1 with bilateral pars defect Bilateral foraminal stenosis L5-S1 R>L  42 yo female presents today for follow up on her back. Patient has undergone RICKI x3 at [facility] as well as extensive PT over several months with minimal relief in her symptoms. Patient with persistent low back pain and radiculopathy, as well as structural instability seen on MRI> Recommend surgical intervention.   - Patient with persistent symptoms refractory to non-operative care. Patient is seeking surgical options. Patient is a candidate for surgery after failing extensive non operative care.   - Recommend NSAIDs PRN - Recommend heating pad use to decrease muscle spasm - Discussed the importance of home exercises, including but not limited to hamstring stretching and core strengthening   Patient was educated on their diagnosis today. All questions answered and patient expressed understanding.  Follow up PRN then 2 weeks after surgery

## 2024-01-19 NOTE — DISCUSSION/SUMMARY
[de-identified] : I discussed non operative and operative treatment options in great detail with the patient. I discussed treatment options, including but not limited to, 1. Non operative treatment - rest, NSAID, physical therapy etc. 2. Interventional treatment - injections etc. 3. Surgical treatment - MIS TLIF L5-S1, autograft, allograft.   - Patient with persistent symptoms refractory to non-operative care. Patient is seeking surgical options. Patient is a candidate for surgery after failing extensive non operative care.  Patient wants to proceed with surgical intervention after failing nonoperative care. I had a lengthy discussion with the patient about the rational and goal of the surgery as well as expected outcome. I encouraged patient to seek a second opinion regarding surgery. I explained postoperative rehabilitation and recovery process to the patient. I discussed risks, benefits and alternatives of the procedure in detail with the patient. I counseled patient about risks and possible complications, including but not limited to, infection, bleeding, nerve injury, arterial and venous injury, single or multiple muscle group weakness, dural tear, CSF leak, pseudomeningocele, arachnoiditis, CSF fistula, meningitis, discitis, osteomyelitis, epidural hematoma, DVT, PE, CRPS, MI, paralysis, pseudoarthrosis, instrumentation malposition, adjacent segment disease, persistent symptoms, and risks of anesthesia. I explained to the patient that the surgical outcome is unpredictable and there is no guarantee that the symptoms will resolve after the surgery. The patient understands and wishes to proceed. All questions were answered and patient was given information to review.

## 2024-03-12 ENCOUNTER — APPOINTMENT (OUTPATIENT)
Dept: ORTHOPEDIC SURGERY | Facility: HOSPITAL | Age: 44
End: 2024-03-12
Payer: MEDICARE

## 2024-03-12 PROCEDURE — 61783 SCAN PROC SPINAL: CPT | Mod: 59

## 2024-03-12 PROCEDURE — 61783 SCAN PROC SPINAL: CPT | Mod: AS,59

## 2024-03-12 PROCEDURE — 22630 ARTHRD PST TQ 1NTRSPC LUM: CPT

## 2024-03-12 PROCEDURE — 22853 INSJ BIOMECHANICAL DEVICE: CPT | Mod: AS

## 2024-03-12 PROCEDURE — 22840 INSERT SPINE FIXATION DEVICE: CPT | Mod: AS

## 2024-03-12 PROCEDURE — 22630 ARTHRD PST TQ 1NTRSPC LUM: CPT | Mod: AS

## 2024-03-12 PROCEDURE — 22840 INSERT SPINE FIXATION DEVICE: CPT

## 2024-03-12 PROCEDURE — 63052 LAM FACETC/FRMT ARTHRD LUM 1: CPT | Mod: AS

## 2024-03-12 PROCEDURE — 22853 INSJ BIOMECHANICAL DEVICE: CPT

## 2024-03-12 PROCEDURE — 63052 LAM FACETC/FRMT ARTHRD LUM 1: CPT | Mod: 59

## 2024-03-18 RX ORDER — GABAPENTIN 300 MG/1
300 CAPSULE ORAL 3 TIMES DAILY
Qty: 90 | Refills: 0 | Status: ACTIVE | COMMUNITY
Start: 2024-03-18 | End: 1900-01-01

## 2024-03-20 ENCOUNTER — APPOINTMENT (OUTPATIENT)
Dept: ORTHOPEDIC SURGERY | Facility: CLINIC | Age: 44
End: 2024-03-20
Payer: MEDICARE

## 2024-03-20 DIAGNOSIS — M79.662 PAIN IN LEFT LOWER LEG: ICD-10-CM

## 2024-03-20 PROCEDURE — 99024 POSTOP FOLLOW-UP VISIT: CPT

## 2024-03-20 PROCEDURE — 72100 X-RAY EXAM L-S SPINE 2/3 VWS: CPT

## 2024-03-20 NOTE — ASSESSMENT
[FreeTextEntry1] : 6/2023 MRI of L-Spine was reviewed today and is as follows:  Bulging disc L4-5 Anterolisthesis L5-S1 with bilateral pars defect Bilateral foraminal stenosis L5-S1 R>L  42 yo female presents today for follow up on her back, S/P Minimally Invasive Transforaminal Interbody Fusion L5-S1 on 3/12/24. X-rays show hardware in proper alignment. Patient with persistent pain down her left leg and tenderness of her left calf. I recommend continuing medication management and ultrasound of the left leg to rule out blood clot.   - Rx given for Doppler today. Will call patients with results  - No bending lifting twisting or driving.  Keep using LSO brace when ambulating.   - Continue pain medication and gabapentin   - Recommend NSAIDs PRN - Recommend heating pad use to decrease muscle spasm - Discussed the importance of home exercises, including but not limited to hamstring stretching and core strengthening   Patient was educated on their diagnosis today. All questions answered and patient expressed understanding.  Follow up in 2 weeks

## 2024-03-20 NOTE — HISTORY OF PRESENT ILLNESS
[de-identified] : S/P Minimally Invasive Transforaminal Interbody Fusion L5-S1 on 3/12/24. Patient denies fevers, chills, SOB. Patient states she feels pain shooting down her left leg. Patient states she has cramps in her left calf.  Patient admits to taking Gabapentin, Oxycodone, and Dilaudid for pain. Patient is wearing LSO brace. Patient is using a cane for ambulation.   Today's Pain 10/10

## 2024-04-01 ENCOUNTER — APPOINTMENT (OUTPATIENT)
Dept: ORTHOPEDIC SURGERY | Facility: CLINIC | Age: 44
End: 2024-04-01
Payer: MEDICARE

## 2024-04-01 PROCEDURE — 99024 POSTOP FOLLOW-UP VISIT: CPT

## 2024-04-01 PROCEDURE — 72100 X-RAY EXAM L-S SPINE 2/3 VWS: CPT

## 2024-04-01 RX ORDER — GABAPENTIN 400 MG/1
400 CAPSULE ORAL 3 TIMES DAILY
Qty: 90 | Refills: 1 | Status: ACTIVE | COMMUNITY
Start: 2024-04-01 | End: 1900-01-01

## 2024-04-01 NOTE — HISTORY OF PRESENT ILLNESS
[de-identified] : S/P Minimally Invasive Transforaminal Interbody Fusion L5-S1 on 3/12/24. Patient states she still has pain radiating down into her left buttock and down the leg. Patient states she feels pain in her tailbone. Patient states she is still experiencing cramps in her left calf. Patient admits to taking Gabapentin, Oxycodone, and Methocarbamol for pain. Patient is wearing LSO brace.   Today's Pain 8/10

## 2024-04-01 NOTE — ASSESSMENT
[FreeTextEntry1] : PREOP 6/2023 MRI of L-Spine was reviewed today and is as follows:  Bulging disc L4-5 Anterolisthesis L5-S1 with bilateral pars defect Bilateral foraminal stenosis L5-S1 R>L  42 yo female presents today for follow up on her back, S/P Minimally Invasive Transforaminal Interbody Fusion L5-S1 on 3/12/24. X-rays show hardware in proper alignment. Patient's doppler negative for DVT. Patient with improvement in nerve symptoms with gabapentin. I recommend increasing dose, as well as slowly tapering off pain medication.   - No bending lifting twisting or driving.  Keep using LSO brace when ambulating.   - Continue pain medication and gabapentin, increase gabapentin to 400mg TID  - Recommend Tylenol PRN - Recommend heating pad use to decrease muscle spasm - Discussed the importance of home exercises, including but not limited to hamstring stretching and core strengthening   Patient was educated on their diagnosis today. All questions answered and patient expressed understanding.  Follow up in 6 weeks

## 2024-05-13 ENCOUNTER — APPOINTMENT (OUTPATIENT)
Dept: ORTHOPEDIC SURGERY | Facility: CLINIC | Age: 44
End: 2024-05-13
Payer: MEDICARE

## 2024-05-13 RX ORDER — OXYCODONE AND ACETAMINOPHEN 10; 325 MG/1; MG/1
10-325 TABLET ORAL
Qty: 20 | Refills: 0 | Status: ACTIVE | COMMUNITY
Start: 2024-03-15 | End: 1900-01-01

## 2024-05-20 ENCOUNTER — APPOINTMENT (OUTPATIENT)
Dept: ORTHOPEDIC SURGERY | Facility: CLINIC | Age: 44
End: 2024-05-20
Payer: MEDICARE

## 2024-05-20 DIAGNOSIS — M53.3 SACROCOCCYGEAL DISORDERS, NOT ELSEWHERE CLASSIFIED: ICD-10-CM

## 2024-05-20 DIAGNOSIS — Z98.1 ARTHRODESIS STATUS: ICD-10-CM

## 2024-05-20 PROCEDURE — 99214 OFFICE O/P EST MOD 30 MIN: CPT | Mod: 24

## 2024-05-20 PROCEDURE — 72100 X-RAY EXAM L-S SPINE 2/3 VWS: CPT

## 2024-05-20 RX ORDER — METHYLPREDNISOLONE 4 MG/1
4 TABLET ORAL
Qty: 1 | Refills: 0 | Status: ACTIVE | COMMUNITY
Start: 2024-05-20 | End: 1900-01-01

## 2024-05-20 NOTE — HISTORY OF PRESENT ILLNESS
[de-identified] : S/P Minimally Invasive Transforaminal Interbody Fusion L5-S1 on 3/12/24. Patient states she has pain radiating down into her left buttock and into the thigh. Patient states she has numbness/tingling in her left leg when standing. Patient admits to taking Gabapentin and Oxycodone for pain. Patient is wearing LSO brace.   Today's Pain 7/10

## 2024-05-20 NOTE — REASON FOR VISIT
[FreeTextEntry2] : lower back pain for years with NKI  Minimally Invasive Transforaminal Interbody Fusion L5-S1 on 3/12/24

## 2024-05-20 NOTE — ASSESSMENT
[FreeTextEntry1] : PREOP 6/2023 MRI of L-Spine was reviewed today and is as follows:  Bulging disc L4-5 Anterolisthesis L5-S1 with bilateral pars defect Bilateral foraminal stenosis L5-S1 R>L  42 yo female presents today for follow up on her back, S/P Minimally Invasive Transforaminal Interbody Fusion L5-S1 on 3/12/24. X-rays show hardware in proper alignment. Patient with persistent low back pain but with improvements in leg symptoms since immediate postop period. Patient also with SI joint pain on the right. I recommend medication management at this time for relief.   - Recommend physical therapy to regain range of motion, strengthening and symptomatic improvement. Prescription given in office today.   - LSO brace PRN   - Use Voltaren as needed for right SI joint pain   - Continue gabapentin 400mg TID for relief on nerve symptoms. Ween off of pain medication as tolerated.   - Recommend Tylenol PRN - Recommend heating pad use to decrease muscle spasm - Discussed the importance of home exercises, including but not limited to hamstring stretching and core strengthening   Patient was educated on their diagnosis today. All questions answered and patient expressed understanding.  Follow up in 2 months

## 2024-06-12 RX ORDER — OXYCODONE AND ACETAMINOPHEN 5; 325 MG/1; MG/1
5-325 TABLET ORAL DAILY
Qty: 10 | Refills: 0 | Status: ACTIVE | COMMUNITY
Start: 2024-05-09 | End: 1900-01-01

## 2024-07-10 RX ORDER — LIDOCAINE 5% 700 MG/1
5 PATCH TOPICAL
Qty: 1 | Refills: 1 | Status: ACTIVE | COMMUNITY
Start: 2024-07-10 | End: 1900-01-01

## 2024-07-22 ENCOUNTER — APPOINTMENT (OUTPATIENT)
Dept: ORTHOPEDIC SURGERY | Facility: CLINIC | Age: 44
End: 2024-07-22
Payer: MEDICARE

## 2024-07-22 DIAGNOSIS — Z98.1 ARTHRODESIS STATUS: ICD-10-CM

## 2024-07-22 DIAGNOSIS — M53.3 SACROCOCCYGEAL DISORDERS, NOT ELSEWHERE CLASSIFIED: ICD-10-CM

## 2024-07-22 PROCEDURE — 99214 OFFICE O/P EST MOD 30 MIN: CPT

## 2024-07-22 PROCEDURE — 72100 X-RAY EXAM L-S SPINE 2/3 VWS: CPT

## 2024-07-22 NOTE — ASSESSMENT
[FreeTextEntry1] : injection - Right sacroiliac joint corticosteroid injection at [facility]  PREOP 6/2023 MRI of L-Spine was reviewed today and is as follows:  Bulging disc L4-5 Anterolisthesis L5-S1 with bilateral pars defect Bilateral foraminal stenosis L5-S1 R>L  44 yo female presents today for follow up on her back, S/P Minimally Invasive Transforaminal Interbody Fusion L5-S1 on 3/12/24. X-rays show hardware in proper alignment. Patient with persistent right SI joint pain despite Voltaren and PT. Recommend injection for relief.    - Patient will continue HEP as detailed in home exercise booklet given in office. Emphasized daily stretching and strengthening.   - Recommend Tylenol PRN - Recommend heating pad use to decrease muscle spasm - Discussed the importance of home exercises, including but not limited to hamstring stretching and core strengthening   Patient was educated on their diagnosis today. All questions answered and patient expressed understanding.  Follow up in 2 weeks after injection

## 2024-07-22 NOTE — HISTORY OF PRESENT ILLNESS
[de-identified] : S/P Minimally Invasive Transforaminal Interbody Fusion L5-S1 on 3/12/24. Patient states she has pain radiating down her right leg. Patient admits to D/C PT due to increased pain. Patient admits to taking Tylenol Arthritis for pain. Patient admits to using Lidocaine patches.  Today's Pain 7/10

## 2024-07-25 RX ORDER — TIZANIDINE 4 MG/1
4 TABLET ORAL
Qty: 90 | Refills: 0 | Status: ACTIVE | COMMUNITY
Start: 2024-07-25 | End: 1900-01-01

## 2024-07-31 ENCOUNTER — APPOINTMENT (OUTPATIENT)
Dept: ORTHOPEDIC SURGERY | Facility: AMBULATORY SURGERY CENTER | Age: 44
End: 2024-07-31
Payer: MEDICARE

## 2024-07-31 PROCEDURE — 20610 DRAIN/INJ JOINT/BURSA W/O US: CPT | Mod: 59,RT

## 2024-07-31 PROCEDURE — 76000 FLUOROSCOPY <1 HR PHYS/QHP: CPT | Mod: 26,59

## 2024-07-31 PROCEDURE — 27096 INJECT SACROILIAC JOINT: CPT | Mod: RT

## 2024-08-10 ENCOUNTER — NON-APPOINTMENT (OUTPATIENT)
Age: 44
End: 2024-08-10

## 2025-03-02 NOTE — ED ADULT NURSE NOTE - CCCP TRG CHIEF CMPLNT
see chief complaint quote
PAST MEDICAL HISTORY:  No pertinent past medical history     Seizures